# Patient Record
Sex: MALE | Race: WHITE | NOT HISPANIC OR LATINO
[De-identification: names, ages, dates, MRNs, and addresses within clinical notes are randomized per-mention and may not be internally consistent; named-entity substitution may affect disease eponyms.]

---
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
## 2021-05-30 VITALS — WEIGHT: 107.14 LBS

## 2021-06-09 NOTE — PROGRESS NOTES
Name: Tim Ricardo  Age: 16 y.o.  Gender: male  : 2000  Date of Encounter: 3/2/2017      Chief Complaint   Patient presents with     Ear Pain     Right x 4 days        HPI:  Tim Ricardo is a 16 y.o. old male who presents to the clinic with mom for evaluation of right ear drainage  Ear drainage preceded by ear pain for about 12 hours  Associated with cough and rhinorrhea  Drainage started 3 days ago  Mom called in [id number] when the drainage started      ROS:  No left ear pain  No fever  No ST  No SA  No headache  No vomiting  No diarrhea    PMH:  Recurrent OM  PE tubes as infant  Wheezing treated with albuterol nebs as young child  S/p tonsillectomy      Objective:  Vitals:   Visit Vitals     Temp 98.6  F (37  C) (Temporal)     Wt 107 lb 2.3 oz (48.6 kg)       Gen: Alert, awake, well appearing  Head: Normocephalic with age appropriate fontanelles.  Eyes: Red reflex present bilaterally. Pupils equally round and reactive to light. Conjunctivae and cornea clear  Ears: Right TM pus level, distorted TM.  No definite drainage seen in canal.  Left TM clear.  Healed myringotomy scar.   Nose:  no rhinorrhea.  Throat:  Oropharynx clear.  Tonsils absent.  Neck: Supple.  No adenopathy.  Heart: Regular rate and rhythm; normal S1 and S2; no murmurs, gallops, or rubs.  Lungs: Unlabored respirations; symmetric chest expansion; occasional expiratory wheezes anteriorly.  Abdomen: Soft, without organomegaly. Bowel sounds normal. Nontender without rebound. No masses palpable. No distention.  Genitalia: deferred  Extremities: No clubbing, cyanosis, or edema. Normal upper and lower extremities.  Skin: Clear  Mental Status: Alert, oriented, in no distress. Appropriate for age.  Neuro: Normal reflexes; normal tone; no focal deficits appreciated. Appropriate for age.    Pertinent results / imaging:  Reviewed     Assessment and Plan:    1. Cough  Tubular Spacer       Patient Instructions   Discontinue azithromycin.    Start Augmentin as  prescribed.    Albuterol inhaler with spacer as needed for cough.     Return if ear drainage persists in 5 to 7 days, or is worsening.    Return in 10 to 14 days if you still feel your ear is plugged.                Chirag Jones MD  3/2/2017